# Patient Record
Sex: MALE | Race: WHITE | NOT HISPANIC OR LATINO
[De-identification: names, ages, dates, MRNs, and addresses within clinical notes are randomized per-mention and may not be internally consistent; named-entity substitution may affect disease eponyms.]

---

## 2022-10-21 ENCOUNTER — APPOINTMENT (OUTPATIENT)
Dept: UROLOGY | Facility: CLINIC | Age: 42
End: 2022-10-21

## 2022-10-21 ENCOUNTER — NON-APPOINTMENT (OUTPATIENT)
Age: 42
End: 2022-10-21

## 2022-10-21 VITALS
RESPIRATION RATE: 20 BRPM | HEIGHT: 72 IN | BODY MASS INDEX: 31.02 KG/M2 | OXYGEN SATURATION: 97 % | SYSTOLIC BLOOD PRESSURE: 120 MMHG | DIASTOLIC BLOOD PRESSURE: 79 MMHG | HEART RATE: 79 BPM | WEIGHT: 229 LBS | TEMPERATURE: 98 F

## 2022-10-21 PROBLEM — Z00.00 ENCOUNTER FOR PREVENTIVE HEALTH EXAMINATION: Status: ACTIVE | Noted: 2022-10-21

## 2022-10-21 LAB
BILIRUB UR QL STRIP: NORMAL
CLARITY UR: CLEAR
COLLECTION METHOD: NORMAL
GLUCOSE UR-MCNC: NORMAL
HCG UR QL: 0.2 EU/DL
HGB UR QL STRIP.AUTO: NORMAL
KETONES UR-MCNC: NORMAL
LEUKOCYTE ESTERASE UR QL STRIP: NORMAL
NITRITE UR QL STRIP: NORMAL
PH UR STRIP: 8
PROT UR STRIP-MCNC: NORMAL
SP GR UR STRIP: 1.01

## 2022-10-21 PROCEDURE — 76857 US EXAM PELVIC LIMITED: CPT

## 2022-10-21 PROCEDURE — 99203 OFFICE O/P NEW LOW 30 MIN: CPT

## 2022-10-21 PROCEDURE — 81003 URINALYSIS AUTO W/O SCOPE: CPT | Mod: QW

## 2022-10-21 NOTE — HISTORY OF PRESENT ILLNESS
[FreeTextEntry1] : 43 YO M seen TODAY 10/21/2022  as NPT for urologic care. He was evaluated by Dr. Alegria in 2016 for prostatitis. Approximately 1 week ago, he started experiencing discomfort urinating, but denies burning sensation. He has also been experiencing a "heaviness" behind his testicles. A recent UA and urine culture from his PCP were negative. No history of STIs. He has tried tylenol in the past for these irritative symptoms with no relief this time.. \par \par UA negative\par IPSS: 12\par SRINIVAS: 25\par SAUL: 3\par Pelvic sono: PVR 67 cc, 31 cc prostate\par \par No medication, NKMA. No FH of prostate cancer.    The patient denies fevers, chills, nausea and or vomiting and no unexplained weight loss. \par All pertinent parts of the patient PFSH (past medical, family and social histories), laboratory, radiological studies and physician notes were reviewed prior to starting the face to face portion of the  visit. Questionnaire results were discussed with patient.\par \par \par \par

## 2022-10-21 NOTE — PHYSICAL EXAM
[General Appearance - Well Developed] : well developed [General Appearance - Well Nourished] : well nourished [Normal Appearance] : normal appearance [Well Groomed] : well groomed [General Appearance - In No Acute Distress] : no acute distress [Oriented To Time, Place, And Person] : oriented to person, place, and time [Affect] : the affect was normal [Mood] : the mood was normal [Not Anxious] : not anxious [Edema] : no peripheral edema [Respiration, Rhythm And Depth] : normal respiratory rhythm and effort [Exaggerated Use Of Accessory Muscles For Inspiration] : no accessory muscle use [Abdomen Soft] : soft [Abdomen Tenderness] : non-tender [Costovertebral Angle Tenderness] : no ~M costovertebral angle tenderness [Urethral Meatus] : meatus normal [Penis Abnormality] : normal circumcised penis [Urinary Bladder Findings] : the bladder was normal on palpation [Scrotum] : the scrotum was normal [Testes Mass (___cm)] : there were no testicular masses [No Prostate Nodules] : no prostate nodules [Normal Station and Gait] : the gait and station were normal for the patient's age [] : no rash [No Focal Deficits] : no focal deficits [Abdomen Hernia] : no hernia was discovered [Epididymis] : the epididymides were normal [Testes Tenderness] : no tenderness of the testes [Prostate Tenderness] : the prostate was not tender [Prostate Size ___ gm] : prostate size [unfilled] gm

## 2022-10-21 NOTE — ASSESSMENT
[FreeTextEntry1] : We discussed the different causes of his dysuria. Physical exam and MAYNOR today were normal. I advised him to take Advil every 6 hours over the weekend for his irritative symptoms and possible prostate inflammation. A urine culture, and urine STI panel was sent to further evaluate. We discussed that if any of these tests are positive, we will treat accordingly.  Patient to return if symptoms persist. Gretta Henry MD\par

## 2022-10-26 ENCOUNTER — NON-APPOINTMENT (OUTPATIENT)
Age: 42
End: 2022-10-26

## 2022-10-26 LAB
BACTERIA UR CULT: NORMAL
C TRACH RRNA SPEC QL NAA+PROBE: NOT DETECTED
N GONORRHOEA RRNA SPEC QL NAA+PROBE: NOT DETECTED
SOURCE AMPLIFICATION: NORMAL

## 2023-11-15 ENCOUNTER — APPOINTMENT (OUTPATIENT)
Dept: UROLOGY | Facility: CLINIC | Age: 43
End: 2023-11-15
Payer: COMMERCIAL

## 2023-11-15 DIAGNOSIS — R30.0 DYSURIA: ICD-10-CM

## 2023-11-15 DIAGNOSIS — R35.1 NOCTURIA: ICD-10-CM

## 2023-11-15 DIAGNOSIS — R10.9 UNSPECIFIED ABDOMINAL PAIN: ICD-10-CM

## 2023-11-15 LAB
BILIRUB UR QL STRIP: NORMAL
CLARITY UR: CLEAR
COLLECTION METHOD: NORMAL
GLUCOSE UR-MCNC: NORMAL
HCG UR QL: 0.2 EU/DL
HGB UR QL STRIP.AUTO: NORMAL
KETONES UR-MCNC: NORMAL
LEUKOCYTE ESTERASE UR QL STRIP: NORMAL
NITRITE UR QL STRIP: NORMAL
PH UR STRIP: 6.5
PROT UR STRIP-MCNC: NORMAL
SP GR UR STRIP: 1.01

## 2023-11-15 PROCEDURE — 81003 URINALYSIS AUTO W/O SCOPE: CPT | Mod: QW

## 2023-11-15 PROCEDURE — 99214 OFFICE O/P EST MOD 30 MIN: CPT

## 2023-11-17 LAB — BACTERIA UR CULT: NORMAL

## 2023-12-26 NOTE — HISTORY OF PRESENT ILLNESS
[FreeTextEntry1] : 44 YO M seen 10/21/2022  as NPT for urologic care. He was evaluated by Dr. Alegria in 2016 for prostatitis. Approximately 1 week ago, he started experiencing discomfort urinating, but denies burning sensation. He has also been experiencing a "heaviness" behind his testicles. A recent UA and urine culture from his PCP were negative. No history of STIs. He has tried tylenol in the past for these irritative symptoms with no relief this time..  UA negative IPSS: 12 SRINIVAS: 25 SAUL: 3 Pelvic sono: PVR 67 cc, 31 cc prostate No medication, NKMA. No FH of prostate cancer.    The patient denies fevers, chills, nausea and or vomiting and no unexplained weight loss.  All pertinent parts of the patient PFSH (past medical, family and social histories), laboratory, radiological studies and physician notes were reviewed prior to starting the face to face portion of the  visit. Questionnaire results were discussed with the patient.  We discussed the different causes of his dysuria. Physical exam and MAYNOR today were normal. I advised him to take Advil every 6 hours over the weekend for his irritative symptoms and possible prostate inflammation. A urine culture, and urine STI panel was sent to further evaluate. We discussed that if any of these tests are positive, we will treat accordingly. Patient to return if symptoms persist.  C+S, GC/Chlamydia all negative.  Patient seen11/15/2023 for kidney pain evaluation. He told me that about 2 months ago, he started working again and has noted the development of nocturia x 2. He denies any gross hematuria or dysuria. He attributes this to an increase in his ETOH and caffeine intake over this same time. He also noted discomfort in the right flank area radiating down to the right groin over the last 2 weeks or so and is concerned that he may have a kidney stone. He denies any prior personal stone history or family history of stone disease. He is on no medication and NKMA. UA negative IPSS 9 We discussed the patient's normal physical examination and urinalysis today. Because of his persistent symptoms, I did send urine for culture today and recommended that he return for a renal and pelvic ultrasound and uroflowmetry to further assess his symptoms. We will plan to have these noninvasive studies done in the office of the next few weeks and based further testing and/or treatment on the results of the above. C+S negative.  Patient seen TODAY 12/28/2023 to reassess with Renal and Pelvic US and Uroflowmetry.

## 2023-12-28 ENCOUNTER — APPOINTMENT (OUTPATIENT)
Dept: UROLOGY | Facility: CLINIC | Age: 43
End: 2023-12-28